# Patient Record
Sex: FEMALE | Race: OTHER | HISPANIC OR LATINO
[De-identification: names, ages, dates, MRNs, and addresses within clinical notes are randomized per-mention and may not be internally consistent; named-entity substitution may affect disease eponyms.]

---

## 2017-11-15 ENCOUNTER — APPOINTMENT (OUTPATIENT)
Dept: OBGYN | Facility: HOSPITAL | Age: 47
End: 2017-11-15

## 2017-11-15 ENCOUNTER — OUTPATIENT (OUTPATIENT)
Dept: OUTPATIENT SERVICES | Facility: HOSPITAL | Age: 47
LOS: 1 days | End: 2017-11-15

## 2017-11-15 ENCOUNTER — OUTPATIENT (OUTPATIENT)
Dept: OUTPATIENT SERVICES | Facility: HOSPITAL | Age: 47
LOS: 1 days | End: 2017-11-15
Payer: COMMERCIAL

## 2017-11-15 ENCOUNTER — APPOINTMENT (OUTPATIENT)
Dept: MAMMOGRAPHY | Facility: IMAGING CENTER | Age: 47
End: 2017-11-15
Payer: COMMERCIAL

## 2017-11-15 DIAGNOSIS — Z12.39 ENCOUNTER FOR OTHER SCREENING FOR MALIGNANT NEOPLASM OF BREAST: ICD-10-CM

## 2017-11-15 DIAGNOSIS — Z00.8 ENCOUNTER FOR OTHER GENERAL EXAMINATION: ICD-10-CM

## 2017-11-15 PROCEDURE — G0202: CPT | Mod: 26

## 2017-11-15 PROCEDURE — 77063 BREAST TOMOSYNTHESIS BI: CPT

## 2017-11-15 PROCEDURE — 77067 SCR MAMMO BI INCL CAD: CPT

## 2017-11-15 PROCEDURE — 77063 BREAST TOMOSYNTHESIS BI: CPT | Mod: 26

## 2018-10-02 ENCOUNTER — MESSAGE (OUTPATIENT)
Age: 48
End: 2018-10-02

## 2018-10-13 ENCOUNTER — RESULT REVIEW (OUTPATIENT)
Age: 48
End: 2018-10-13

## 2018-10-13 ENCOUNTER — APPOINTMENT (OUTPATIENT)
Dept: MAMMOGRAPHY | Facility: IMAGING CENTER | Age: 48
End: 2018-10-13

## 2018-10-13 ENCOUNTER — APPOINTMENT (OUTPATIENT)
Dept: OBGYN | Facility: HOSPITAL | Age: 48
End: 2018-10-13

## 2018-10-13 ENCOUNTER — OUTPATIENT (OUTPATIENT)
Dept: OUTPATIENT SERVICES | Facility: HOSPITAL | Age: 48
LOS: 1 days | End: 2018-10-13
Payer: MEDICAID

## 2018-10-13 DIAGNOSIS — Z12.39 ENCOUNTER FOR OTHER SCREENING FOR MALIGNANT NEOPLASM OF BREAST: ICD-10-CM

## 2018-10-13 PROCEDURE — 88141 CYTOPATH C/V INTERPRET: CPT

## 2018-10-15 LAB — HPV HIGH+LOW RISK DNA PNL CVX: SIGNIFICANT CHANGE UP

## 2018-10-17 LAB — CYTOLOGY SPEC DOC CYTO: SIGNIFICANT CHANGE UP

## 2018-10-23 ENCOUNTER — APPOINTMENT (OUTPATIENT)
Dept: ULTRASOUND IMAGING | Facility: IMAGING CENTER | Age: 48
End: 2018-10-23
Payer: MEDICAID

## 2018-10-23 ENCOUNTER — OUTPATIENT (OUTPATIENT)
Dept: OUTPATIENT SERVICES | Facility: HOSPITAL | Age: 48
LOS: 1 days | End: 2018-10-23
Payer: MEDICAID

## 2018-10-23 ENCOUNTER — APPOINTMENT (OUTPATIENT)
Dept: MAMMOGRAPHY | Facility: IMAGING CENTER | Age: 48
End: 2018-10-23
Payer: MEDICAID

## 2018-10-23 DIAGNOSIS — Z00.8 ENCOUNTER FOR OTHER GENERAL EXAMINATION: ICD-10-CM

## 2018-10-23 PROCEDURE — 77066 DX MAMMO INCL CAD BI: CPT

## 2018-10-23 PROCEDURE — 76641 ULTRASOUND BREAST COMPLETE: CPT | Mod: 26,50

## 2018-10-23 PROCEDURE — G0279: CPT

## 2018-10-23 PROCEDURE — 77066 DX MAMMO INCL CAD BI: CPT | Mod: 26

## 2018-10-23 PROCEDURE — G0279: CPT | Mod: 26

## 2018-10-23 PROCEDURE — 76641 ULTRASOUND BREAST COMPLETE: CPT

## 2018-11-13 ENCOUNTER — APPOINTMENT (OUTPATIENT)
Dept: SURGICAL ONCOLOGY | Facility: CLINIC | Age: 48
End: 2018-11-13
Payer: MEDICAID

## 2018-11-13 VITALS
OXYGEN SATURATION: 98 % | DIASTOLIC BLOOD PRESSURE: 93 MMHG | HEIGHT: 59 IN | WEIGHT: 142 LBS | HEART RATE: 79 BPM | BODY MASS INDEX: 28.63 KG/M2 | SYSTOLIC BLOOD PRESSURE: 137 MMHG

## 2018-11-13 PROCEDURE — 99203 OFFICE O/P NEW LOW 30 MIN: CPT

## 2019-09-23 ENCOUNTER — MESSAGE (OUTPATIENT)
Age: 49
End: 2019-09-23

## 2019-10-10 ENCOUNTER — MESSAGE (OUTPATIENT)
Age: 49
End: 2019-10-10

## 2019-11-21 ENCOUNTER — APPOINTMENT (OUTPATIENT)
Dept: MAMMOGRAPHY | Facility: IMAGING CENTER | Age: 49
End: 2019-11-21
Payer: MEDICAID

## 2019-11-21 ENCOUNTER — APPOINTMENT (OUTPATIENT)
Dept: ULTRASOUND IMAGING | Facility: IMAGING CENTER | Age: 49
End: 2019-11-21
Payer: MEDICAID

## 2019-11-21 ENCOUNTER — APPOINTMENT (OUTPATIENT)
Dept: OBGYN | Facility: HOSPITAL | Age: 49
End: 2019-11-21

## 2019-11-21 ENCOUNTER — OUTPATIENT (OUTPATIENT)
Dept: OUTPATIENT SERVICES | Facility: HOSPITAL | Age: 49
LOS: 1 days | End: 2019-11-21
Payer: MEDICAID

## 2019-11-21 ENCOUNTER — OUTPATIENT (OUTPATIENT)
Dept: OUTPATIENT SERVICES | Facility: HOSPITAL | Age: 49
LOS: 1 days | End: 2019-11-21

## 2019-11-21 DIAGNOSIS — Z00.8 ENCOUNTER FOR OTHER GENERAL EXAMINATION: ICD-10-CM

## 2019-11-21 PROCEDURE — 77063 BREAST TOMOSYNTHESIS BI: CPT

## 2019-11-21 PROCEDURE — 76641 ULTRASOUND BREAST COMPLETE: CPT | Mod: 26,50

## 2019-11-21 PROCEDURE — 77063 BREAST TOMOSYNTHESIS BI: CPT | Mod: 26

## 2019-11-21 PROCEDURE — 76641 ULTRASOUND BREAST COMPLETE: CPT

## 2019-11-21 PROCEDURE — 77067 SCR MAMMO BI INCL CAD: CPT

## 2019-11-21 PROCEDURE — 77067 SCR MAMMO BI INCL CAD: CPT | Mod: 26

## 2019-11-22 DIAGNOSIS — Z12.39 ENCOUNTER FOR OTHER SCREENING FOR MALIGNANT NEOPLASM OF BREAST: ICD-10-CM

## 2019-11-25 ENCOUNTER — MESSAGE (OUTPATIENT)
Age: 49
End: 2019-11-25

## 2021-02-03 ENCOUNTER — APPOINTMENT (OUTPATIENT)
Dept: MAMMOGRAPHY | Facility: IMAGING CENTER | Age: 51
End: 2021-02-03

## 2021-02-03 ENCOUNTER — APPOINTMENT (OUTPATIENT)
Dept: ULTRASOUND IMAGING | Facility: IMAGING CENTER | Age: 51
End: 2021-02-03

## 2021-11-08 ENCOUNTER — RESULT REVIEW (OUTPATIENT)
Age: 51
End: 2021-11-08

## 2021-11-08 ENCOUNTER — APPOINTMENT (OUTPATIENT)
Dept: OBGYN | Facility: HOSPITAL | Age: 51
End: 2021-11-08
Payer: MEDICAID

## 2021-11-08 ENCOUNTER — OUTPATIENT (OUTPATIENT)
Dept: OUTPATIENT SERVICES | Facility: HOSPITAL | Age: 51
LOS: 1 days | End: 2021-11-08
Payer: MEDICAID

## 2021-11-08 VITALS
TEMPERATURE: 97.3 F | HEART RATE: 80 BPM | HEIGHT: 59 IN | DIASTOLIC BLOOD PRESSURE: 91 MMHG | BODY MASS INDEX: 28.43 KG/M2 | WEIGHT: 141 LBS | SYSTOLIC BLOOD PRESSURE: 143 MMHG

## 2021-11-08 DIAGNOSIS — Z01.419 ENCOUNTER FOR GYNECOLOGICAL EXAMINATION (GENERAL) (ROUTINE) WITHOUT ABNORMAL FINDINGS: ICD-10-CM

## 2021-11-08 LAB — HIV 1+2 AB+HIV1 P24 AG SERPL QL IA: SIGNIFICANT CHANGE UP

## 2021-11-08 PROCEDURE — 99396 PREV VISIT EST AGE 40-64: CPT | Mod: GC

## 2021-11-08 PROCEDURE — 88141 CYTOPATH C/V INTERPRET: CPT

## 2021-11-09 LAB
C TRACH RRNA SPEC QL NAA+PROBE: DETECTED
HBV SURFACE AG SER-ACNC: SIGNIFICANT CHANGE UP
HCV AB S/CO SERPL IA: 0.13 S/CO — SIGNIFICANT CHANGE UP (ref 0–0.99)
HCV AB SERPL-IMP: SIGNIFICANT CHANGE UP
HPV HIGH+LOW RISK DNA PNL CVX: SIGNIFICANT CHANGE UP
N GONORRHOEA RRNA SPEC QL NAA+PROBE: SIGNIFICANT CHANGE UP
SPECIMEN SOURCE: SIGNIFICANT CHANGE UP
T PALLIDUM AB TITR SER: NEGATIVE — SIGNIFICANT CHANGE UP

## 2021-11-11 LAB — CYTOLOGY SPEC DOC CYTO: SIGNIFICANT CHANGE UP

## 2021-11-13 NOTE — PLAN
[FreeTextEntry1] : 51 year old presents to clinic for annual physical exam. Patient has no complaints today.\par \par 1. HCM\par -pap/HPV done 11/8\par -Complete STI panel completed 11/8\par -Mammogram referral given 11/8\par -Last colonoscopy 2020, next due for colonoscopy in 2021\par \par 2. Nabothian Cyst on Cervix\par -Pap smear completed today, will f/u results with patient the results\par \par Seen and discussed w Dr. Soriano\par \par Miesha Herrera, PGY-1

## 2021-11-13 NOTE — HISTORY OF PRESENT ILLNESS
[FreeTextEntry1] : 51 year old  LMP 10/12/21 presents to clinic for annual exam. Patient has no complaints today. Patient states she still gets menstrual period every month. States the periods last about 3-5 days and on her heaviest days she normally goes through 2-3 maternity pads/day. Denies any intermenstrual bleeding and any dysmenorrhea. Patient denies any abnormal vaginal discharge, dysuria, increase in urinary frequency. Denies any hot flashes. Patient states she is sexually active with her  in a monogamous relationship. Does not use protection or birth control.\par \par OB:  x2, denies abortions or miscarriages\par Gyn: +cysts, -fibroids, -STIs, pap smear in 2018 significant for atypical cells of undetermined significance, pap smear in 2019 showed no evidence of intraepithelial malignancy.\par \par PMHx: HTN on Atenolol and Chlorthalidone\par PSHx: L ovarian cystectomy ()\par All: None\par \par HCM:\par Last pap 2019: No evidence of intraepthelial malignancy\par Colonoscopy: 2020 showed colitis. Patient was placed on antibiotics. Recommended to f/u in 3 years.\par Mammogram: 2017 No evidence of masses

## 2021-11-13 NOTE — PHYSICAL EXAM
[Appropriately responsive] : appropriately responsive [Alert] : alert [No Acute Distress] : no acute distress [Regular Rate Rhythm] : regular rate rhythm [No Murmurs] : no murmurs [Clear to Auscultation B/L] : clear to auscultation bilaterally [Soft] : soft [Non-tender] : non-tender [Non-distended] : non-distended [No Mass] : no mass [Oriented x3] : oriented x3 [Examination Of The Breasts] : a normal appearance [No Masses] : no breast masses were palpable [Labia Majora] : normal [Labia Minora] : normal [Nabothian Cyst ___ cm] : [unfilled] ~Ucm nabothian cyst [Normal] : normal [Anteversion] : anteverted [Uterine Adnexae] : non-palpable [FreeTextEntry8] : No CMT

## 2021-11-16 ENCOUNTER — NON-APPOINTMENT (OUTPATIENT)
Age: 51
End: 2021-11-16

## 2021-11-18 ENCOUNTER — APPOINTMENT (OUTPATIENT)
Dept: OBGYN | Facility: HOSPITAL | Age: 51
End: 2021-11-18
Payer: MEDICAID

## 2021-11-18 ENCOUNTER — TRANSCRIPTION ENCOUNTER (OUTPATIENT)
Age: 51
End: 2021-11-18

## 2021-11-18 ENCOUNTER — OUTPATIENT (OUTPATIENT)
Dept: OUTPATIENT SERVICES | Facility: HOSPITAL | Age: 51
LOS: 1 days | End: 2021-11-18

## 2021-11-18 ENCOUNTER — NON-APPOINTMENT (OUTPATIENT)
Age: 51
End: 2021-11-18

## 2021-11-18 VITALS
SYSTOLIC BLOOD PRESSURE: 116 MMHG | HEART RATE: 77 BPM | TEMPERATURE: 97.4 F | HEIGHT: 59 IN | DIASTOLIC BLOOD PRESSURE: 76 MMHG | WEIGHT: 137 LBS | BODY MASS INDEX: 27.62 KG/M2

## 2021-11-18 DIAGNOSIS — N63.0 UNSPECIFIED LUMP IN UNSPECIFIED BREAST: ICD-10-CM

## 2021-11-18 DIAGNOSIS — Z00.00 ENCOUNTER FOR GENERAL ADULT MEDICAL EXAMINATION W/OUT ABNORMAL FINDINGS: ICD-10-CM

## 2021-11-18 DIAGNOSIS — A74.9 CHLAMYDIAL INFECTION, UNSPECIFIED: ICD-10-CM

## 2021-11-18 PROCEDURE — 99213 OFFICE O/P EST LOW 20 MIN: CPT | Mod: GC

## 2021-11-18 RX ORDER — AZITHROMYCIN 250 MG/1
250 TABLET, FILM COATED ORAL
Qty: 0 | Refills: 0 | Status: COMPLETED | OUTPATIENT
Start: 2021-11-18

## 2021-11-18 RX ADMIN — AZITHROMYCIN MONOHYDRATE 4 MG: 250 TABLET ORAL at 00:00

## 2021-11-18 RX ADMIN — AZITHROMYCIN 4 MG: 250 TABLET, FILM COATED ORAL at 00:00

## 2021-11-20 PROBLEM — A74.9 CHLAMYDIA INFECTION: Status: ACTIVE | Noted: 2021-11-18

## 2021-11-20 PROBLEM — N63.0 BREAST LUMP OR MASS: Status: ACTIVE | Noted: 2018-11-01

## 2021-11-20 NOTE — PLAN
[FreeTextEntry1] : 50yo , LMP 2021 here for treatment of Chlamydia\par \par #Chlamydia\par - Ordered 1000mg of Azithromycin to be given today\par - WIll send patient home with 1000mg of Azithromycin (ordered under pt) for partner Hosea Iniguez for expedited partner therapy. Patient denies partner having any allergies to medications \par - WIll have patient return for [facility] on 2021. Pt is leaving for [facility] on \par - Had SW to discuss how to have a discussion with her partner \par - Discussed that other STI testing was negative \par \par Dwaine Cuenca, PGY-1\par d/w Dr. Mckay \par \par RTC for test of cure in x2 weeks

## 2021-11-20 NOTE — HISTORY OF PRESENT ILLNESS
[FreeTextEntry1] : 50yo , LMP 2021, who presents for treatment of chlamydia found on STI testing during an annual on 2021. Patient asymptomatic. Denies fevers, chills, vaginal discharge, or pelvic pain. Is monogamous with her partner and says she has not observed any symptoms on his end

## 2021-11-29 ENCOUNTER — RESULT REVIEW (OUTPATIENT)
Age: 51
End: 2021-11-29

## 2021-11-29 ENCOUNTER — APPOINTMENT (OUTPATIENT)
Dept: MAMMOGRAPHY | Facility: CLINIC | Age: 51
End: 2021-11-29
Payer: MEDICAID

## 2021-11-29 PROCEDURE — 77067 SCR MAMMO BI INCL CAD: CPT

## 2021-11-29 PROCEDURE — 77063 BREAST TOMOSYNTHESIS BI: CPT

## 2021-11-30 ENCOUNTER — APPOINTMENT (OUTPATIENT)
Dept: OBGYN | Facility: HOSPITAL | Age: 51
End: 2021-11-30

## 2023-07-21 ENCOUNTER — APPOINTMENT (OUTPATIENT)
Dept: OBGYN | Facility: CLINIC | Age: 53
End: 2023-07-21
Payer: MEDICAID

## 2023-07-21 VITALS
DIASTOLIC BLOOD PRESSURE: 91 MMHG | HEIGHT: 59 IN | HEART RATE: 70 BPM | WEIGHT: 131 LBS | BODY MASS INDEX: 26.41 KG/M2 | SYSTOLIC BLOOD PRESSURE: 138 MMHG

## 2023-07-21 DIAGNOSIS — Z01.419 ENCOUNTER FOR GYNECOLOGICAL EXAMINATION (GENERAL) (ROUTINE) W/OUT ABNORMAL FINDINGS: ICD-10-CM

## 2023-07-21 DIAGNOSIS — Z78.9 OTHER SPECIFIED HEALTH STATUS: ICD-10-CM

## 2023-07-21 PROCEDURE — 99396 PREV VISIT EST AGE 40-64: CPT

## 2023-07-21 RX ORDER — ATENOLOL AND CHLORTHALIDONE 50; 25 MG/1; MG/1
50-25 TABLET ORAL
Refills: 0 | Status: ACTIVE | COMMUNITY

## 2023-07-21 NOTE — PLAN
[FreeTextEntry1] : 53 y/o presents for annual visit. \par \par #HM\par -Pap with HPV today\par -STI panel today \par -Mammogram up to date\par -Colonoscopy up to date\par \par #Fibroid uterus on CT\par -Requisition for pelvic sono provided \par \par miguelina CONTRERAS\par

## 2023-07-21 NOTE — PHYSICAL EXAM
[Chaperone Present] : A chaperone was present in the examining room during all aspects of the physical examination [FreeTextEntry1] : THEA Mckee [Appropriately responsive] : appropriately responsive [Alert] : alert [No Acute Distress] : no acute distress [No Lymphadenopathy] : no lymphadenopathy [Regular Rate Rhythm] : regular rate rhythm [No Murmurs] : no murmurs [Clear to Auscultation B/L] : clear to auscultation bilaterally [Soft] : soft [Non-tender] : non-tender [Non-distended] : non-distended [No HSM] : No HSM [No Lesions] : no lesions [No Mass] : no mass [Oriented x3] : oriented x3 [Examination Of The Breasts] : a normal appearance [Breast Palpation Diffuse Fibrous Tissue Bilateral] : fibrocystic changes [No Masses] : no breast masses were palpable [Labia Majora] : normal [Labia Minora] : normal [Nabothian Cyst ___ cm] : [unfilled] ~Ucm nabothian cyst [Normal] : normal [Tenderness] : nontender [Enlarged ___ wks] : enlarged [unfilled] ~Uweeks [Uterine Adnexae] : normal

## 2023-07-24 LAB
C TRACH RRNA SPEC QL NAA+PROBE: NOT DETECTED
HBV SURFACE AG SER QL: NONREACTIVE
HCV AB SER QL: NONREACTIVE
HCV S/CO RATIO: 0.08 S/CO
HIV1+2 AB SPEC QL IA.RAPID: NONREACTIVE
HPV HIGH+LOW RISK DNA PNL CVX: NOT DETECTED
N GONORRHOEA RRNA SPEC QL NAA+PROBE: NOT DETECTED
SOURCE AMPLIFICATION: NORMAL
T PALLIDUM AB SER QL IA: NEGATIVE

## 2023-07-26 LAB — CYTOLOGY CVX/VAG DOC THIN PREP: ABNORMAL

## 2023-08-18 ENCOUNTER — APPOINTMENT (OUTPATIENT)
Dept: OBGYN | Facility: CLINIC | Age: 53
End: 2023-08-18